# Patient Record
Sex: MALE | Race: ASIAN | NOT HISPANIC OR LATINO
[De-identification: names, ages, dates, MRNs, and addresses within clinical notes are randomized per-mention and may not be internally consistent; named-entity substitution may affect disease eponyms.]

---

## 2017-06-29 ENCOUNTER — APPOINTMENT (OUTPATIENT)
Dept: SURGERY | Facility: CLINIC | Age: 32
End: 2017-06-29

## 2017-06-29 PROBLEM — Z00.00 ENCOUNTER FOR PREVENTIVE HEALTH EXAMINATION: Status: ACTIVE | Noted: 2017-06-29

## 2018-02-07 ENCOUNTER — OUTPATIENT (OUTPATIENT)
Dept: OUTPATIENT SERVICES | Facility: HOSPITAL | Age: 33
LOS: 1 days | End: 2018-02-07

## 2018-02-07 ENCOUNTER — APPOINTMENT (OUTPATIENT)
Dept: SURGERY | Facility: CLINIC | Age: 33
End: 2018-02-07

## 2018-02-07 DIAGNOSIS — H33.051 TOTAL RETINAL DETACHMENT, RIGHT EYE: ICD-10-CM

## 2018-02-07 DIAGNOSIS — Z01.818 ENCOUNTER FOR OTHER PREPROCEDURAL EXAMINATION: ICD-10-CM

## 2018-02-08 ENCOUNTER — OUTPATIENT (OUTPATIENT)
Dept: OUTPATIENT SERVICES | Facility: HOSPITAL | Age: 33
LOS: 1 days | Discharge: HOME | End: 2018-02-08

## 2018-02-11 ENCOUNTER — EMERGENCY (EMERGENCY)
Facility: HOSPITAL | Age: 33
LOS: 1 days | Discharge: HOME | End: 2018-02-11
Attending: EMERGENCY MEDICINE
Payer: MEDICAID

## 2018-02-11 VITALS
RESPIRATION RATE: 18 BRPM | OXYGEN SATURATION: 100 % | SYSTOLIC BLOOD PRESSURE: 142 MMHG | TEMPERATURE: 98 F | DIASTOLIC BLOOD PRESSURE: 94 MMHG | HEART RATE: 97 BPM

## 2018-02-11 PROCEDURE — 99283 EMERGENCY DEPT VISIT LOW MDM: CPT

## 2018-02-11 PROCEDURE — 99284 EMERGENCY DEPT VISIT MOD MDM: CPT

## 2018-02-11 RX ORDER — OXYCODONE AND ACETAMINOPHEN 5; 325 MG/1; MG/1
2 TABLET ORAL ONCE
Qty: 0 | Refills: 0 | Status: DISCONTINUED | OUTPATIENT
Start: 2018-02-11 | End: 2018-02-11

## 2018-02-11 RX ORDER — OXYCODONE AND ACETAMINOPHEN 5; 325 MG/1; MG/1
1 TABLET ORAL ONCE
Qty: 0 | Refills: 0 | Status: DISCONTINUED | OUTPATIENT
Start: 2018-02-11 | End: 2018-02-11

## 2018-02-11 RX ADMIN — OXYCODONE AND ACETAMINOPHEN 2 TABLET(S): 5; 325 TABLET ORAL at 18:08

## 2018-02-11 NOTE — ED PROVIDER NOTE - PHYSICAL EXAMINATION
right eye subconjunctival hemorrhage. cloudly cornea. EOMI intact. no proptosis. blurry vision to right eye. left eye 20/40. minimal reactive pupil. increased tearing. full neuro intact.

## 2018-02-11 NOTE — ED PROVIDER NOTE - BOTH EYES:     20/
right eye subconjunctival hemorrhage. cloudly cornea. EOMI intact. no proptosis. blurry vision to right eye. left eye 20/40. minimal reactive pupil. increased tearing. EOMI. no pain on eye movement.

## 2018-02-11 NOTE — ED ADULT NURSE NOTE - CHIEF COMPLAINT QUOTE
Right eye redness and swelling x 1 day with headache. As per patient he had eye surgery done last Thursday at [facility].

## 2018-02-11 NOTE — ED ADULT NURSE NOTE - OBJECTIVE STATEMENT
Patient presented to er with c/o right eye pain and redness with swelling s/o eye surgery 5 days ago.

## 2018-02-11 NOTE — ED PROVIDER NOTE - PROGRESS NOTE DETAILS
pain improved. tolerated po. headache gone. spoke with Dr. Isaias Jones. will see patient tonight or tomorrow A.M. Patient and family will see Tomorrow A.M. as symptoms have much improved.

## 2018-02-11 NOTE — ED PROVIDER NOTE - MEDICAL DECISION MAKING DETAILS
32M s/p retinal surgery to right eye c/o eye pain. On exam does not reveal signs of intraocular pressure nor any focal neurologic deficients. Will provide pain control and contact retinal surgeon regarding followup. likely outpt followup. Low concern for ICH or CVA.

## 2018-02-11 NOTE — ED PROVIDER NOTE - OBJECTIVE STATEMENT
32M p/w right eye swelling and redness. s/p right retinal procedure 4 days ago. bandage removed 3 days ago. s/p scleral buckle. here for increased pain. vision unchanged. radiating to right head. no N/V/D. no fever or chills. no trauma or falls. left eye normal. took tylenol 3 today. no improvement.

## 2018-02-11 NOTE — ED ADULT TRIAGE NOTE - CHIEF COMPLAINT QUOTE
Right eye redness and swelling x 1 day with headache. As per patient he had eye surgery done last Thursday at [facility].

## 2022-05-20 ENCOUNTER — EMERGENCY (EMERGENCY)
Facility: HOSPITAL | Age: 37
LOS: 1 days | Discharge: HOME | End: 2022-05-20
Attending: STUDENT IN AN ORGANIZED HEALTH CARE EDUCATION/TRAINING PROGRAM | Admitting: STUDENT IN AN ORGANIZED HEALTH CARE EDUCATION/TRAINING PROGRAM
Payer: MEDICAID

## 2022-05-20 VITALS
DIASTOLIC BLOOD PRESSURE: 89 MMHG | RESPIRATION RATE: 16 BRPM | TEMPERATURE: 97 F | OXYGEN SATURATION: 99 % | HEIGHT: 69 IN | SYSTOLIC BLOOD PRESSURE: 141 MMHG | HEART RATE: 82 BPM

## 2022-05-20 DIAGNOSIS — Z98.49 CATARACT EXTRACTION STATUS, UNSPECIFIED EYE: Chronic | ICD-10-CM

## 2022-05-20 PROCEDURE — 99283 EMERGENCY DEPT VISIT LOW MDM: CPT

## 2022-05-20 NOTE — ED PROVIDER NOTE - EYE, LEFT
no lacrimal draining, no conjunctiva erythema, no tenderness/swelling, no pain with EOM/clear/pupils equal, round, and reactive to light

## 2022-05-20 NOTE — ED PROVIDER NOTE - NSFOLLOWUPINSTRUCTIONS_ED_ALL_ED_FT
Rest, drink plenty of fluids.  Advance activity as tolerated.  Continue all previously prescribed medications as directed.  Follow up with your primary care physician & ophthalmology  in 48-72 hours- bring copies of your results.  Return to the ER for worsening or persistent symptoms, and/or ANY NEW OR CONCERNING SYMPTOMS. If you have issues obtaining follow up, please call: [phone] to obtain a doctor or specialist who takes your insurance in your area.     You can also follow up with our Ophthalmology clinic: [address], [address] [facility], [place], [place], call ([phone]) to make next available appointment.

## 2022-05-20 NOTE — ED ADULT TRIAGE NOTE - CHIEF COMPLAINT QUOTE
Pt recently had cataract surgery about 1 week ago, endorsing "cloudy vision in left eye." denies vision loss, pain, redness/swelling to eye. Denies PMHx.

## 2022-05-20 NOTE — ED PROVIDER NOTE - OBJECTIVE STATEMENT
35 yo M with PMH of left eye retinal tear w/ silicone oil injection a year ago, s/p left cataract surgery last Thursday (5/13/22), right eye retinal detachment, presents to ER c/o cloudy vision of left eye after surgery. Reports he can see but cloudy right after the cataract surgery. States he followed with his ophthalmologist on Wednesday, told he will need laser treatment for the capsule in the eye to improve his vision. Denies any fever, chills, eye pain, diplopia, flashes, eye drainage, dizziness, headache, n/v/d, weakness, numbness or any other complaints. 37 yo M with PMH of left eye retinal tear w/ silicone oil injection a year ago, s/p left cataract surgery last Thursday (5/13/22), right eye retinal detachment, presents to ER c/o cloudy vision of left eye after surgery. Reports he can see but cloudy right after the cataract surgery. States he followed with his ophthalmologist on Wednesday, told he will need laser treatment for the capsule in the eye to improve his vision. Denies any fever, chills, eye pain, diplopia, flashes, eye drainage, dizziness, headache, n/v/d, weakness, numbness, photophobia, or any other complaints.

## 2022-05-20 NOTE — ED PROVIDER NOTE - NSDCPRINTRESULTS_ED_ALL_ED
decreased ability to use legs for bridging/pushing
Patient requests all Lab, Cardiology, and Radiology Results on their Discharge Instructions

## 2022-05-20 NOTE — ED PROVIDER NOTE - CARE PLAN
Principal Discharge DX:	Post-operative complication  Secondary Diagnosis:	Blurry vision, left eye   1

## 2022-05-20 NOTE — ED PROVIDER NOTE - PATIENT PORTAL LINK FT
You can access the FollowMyHealth Patient Portal offered by [facility] by registering at the following website: [email or web address]. By joining [facility]’s FollowMyHealth portal, you will also be able to view your health information using other applications (apps) compatible with our system.

## 2022-05-20 NOTE — ED PROVIDER NOTE - ATTENDING APP SHARED VISIT CONTRIBUTION OF CARE
I have personally seen and examined this patient.  I have fully participated in the care of this patient. I performed a substantive portion of the visit including all aspects of the medical decision making. I have reviewed all pertinent clinical information, including history, physical exam, plan and the ACP’s note and agree except as noted. - MD Miryam.    35 yo M, post cataract surgery, s/p retinal detatchment, + left blurry vision, x7wk no red eyes or proptotic, low suspicion for intraocular infectious, pt agrees with out pt optho follow up, will touch [facility] optholmologist for general information of post surgical change.

## 2022-05-20 NOTE — ED PROVIDER NOTE - CLINICAL SUMMARY MEDICAL DECISION MAKING FREE TEXT BOX
35 yo M with PMH of left eye retinal tear w/ silicone oil injection a year ago, s/p left cataract surgery last Thursday (5/13/22), right eye retinal detachment, presents to ER c/o cloudy vision of left eye after surgery. well appearing male. There is no fever. low concern for infectious etiology. Plan: ophthalmology consult & reassess.

## 2022-05-20 NOTE — ED PROVIDER NOTE - PROGRESS NOTE DETAILS
EMILY AYALA: spoke with ophthalmologist, states the previous oil in the eye likely causes the cataract; recommend f/u with pt's surgeon; will has other patients to see, probably take a long time to evaluate patient here. EMILY AYALA: discussed with patient w/ ophthalmology recommendations; patient felt happy & reassured, doesn't want to wait for ophthal eval here, will f/u with his surgeons. Discussed with attending, patient can be discharged home to f/u with PCP & opthalmology. The patient was given verbal and written discharge instructions. Specifically, instructions when to return to the ED and when to seek follow-up from their pcp was discussed. Any specialty follow-up was discussed, including how to make an appointment.  Instructions were discussed in simple, plain language and was understood by the patient. The patient understands that should their symptoms worsen or any new symptoms arise, they should return to the ED immediately for further evaluation. All pt's questions were answered. Patient verbalizes understanding. EMILY AYALA: spoke with ophthalmology, states the previous oil in the eye likely causes the cataract; recommend f/u with pt's surgeon; will has other patients to see, probably take a long time to evaluate patient here.

## 2023-12-26 NOTE — ED PROVIDER NOTE - NEUROLOGICAL NECK
Adderall XR 30mg Pending    Insurance response  Prescription Drug Insurance: [place] Medicaid    Do not co-sign PA.     PA will need to be sent in for manual PA review by pharmacy due to active PA on file for Adderall XR 25mg used in conjunction.     [facility]. Please sign and fax to [facility] [phone], and they will complete the PA. Please update encounter when faxed to pharmacy. Thank you!        normal
